# Patient Record
Sex: MALE | Race: WHITE
[De-identification: names, ages, dates, MRNs, and addresses within clinical notes are randomized per-mention and may not be internally consistent; named-entity substitution may affect disease eponyms.]

---

## 2020-02-05 ENCOUNTER — HOSPITAL ENCOUNTER (INPATIENT)
Dept: HOSPITAL 19 - IMCU | Age: 65
LOS: 6 days | Discharge: NURSING, REHAB OR LONG-TERM CARE | DRG: 281 | End: 2020-02-11
Attending: FAMILY MEDICINE | Admitting: FAMILY MEDICINE
Payer: MEDICARE

## 2020-02-05 VITALS — OXYGEN SATURATION: 98 %

## 2020-02-05 VITALS — OXYGEN SATURATION: 99 %

## 2020-02-05 VITALS — OXYGEN SATURATION: 100 %

## 2020-02-05 VITALS — OXYGEN SATURATION: 87 %

## 2020-02-05 VITALS — OXYGEN SATURATION: 97 %

## 2020-02-05 VITALS
HEART RATE: 87 BPM | SYSTOLIC BLOOD PRESSURE: 145 MMHG | TEMPERATURE: 98.6 F | OXYGEN SATURATION: 100 % | DIASTOLIC BLOOD PRESSURE: 83 MMHG

## 2020-02-05 VITALS — TEMPERATURE: 98.5 F | HEART RATE: 92 BPM | SYSTOLIC BLOOD PRESSURE: 143 MMHG | DIASTOLIC BLOOD PRESSURE: 83 MMHG

## 2020-02-05 VITALS — OXYGEN SATURATION: 96 %

## 2020-02-05 VITALS — OXYGEN SATURATION: 92 %

## 2020-02-05 VITALS — OXYGEN SATURATION: 95 %

## 2020-02-05 VITALS — OXYGEN SATURATION: 91 %

## 2020-02-05 VITALS — OXYGEN SATURATION: 93 %

## 2020-02-05 VITALS — BODY MASS INDEX: 28.2 KG/M2 | WEIGHT: 179.68 LBS | HEIGHT: 67.01 IN

## 2020-02-05 DIAGNOSIS — I08.1: ICD-10-CM

## 2020-02-05 DIAGNOSIS — F15.10: ICD-10-CM

## 2020-02-05 DIAGNOSIS — I11.0: ICD-10-CM

## 2020-02-05 DIAGNOSIS — Z87.891: ICD-10-CM

## 2020-02-05 DIAGNOSIS — E11.9: ICD-10-CM

## 2020-02-05 DIAGNOSIS — Z79.84: ICD-10-CM

## 2020-02-05 DIAGNOSIS — F41.9: ICD-10-CM

## 2020-02-05 DIAGNOSIS — M79.7: ICD-10-CM

## 2020-02-05 DIAGNOSIS — I48.91: ICD-10-CM

## 2020-02-05 DIAGNOSIS — E87.2: ICD-10-CM

## 2020-02-05 DIAGNOSIS — F31.9: ICD-10-CM

## 2020-02-05 DIAGNOSIS — G40.909: ICD-10-CM

## 2020-02-05 DIAGNOSIS — S82.62XA: ICD-10-CM

## 2020-02-05 DIAGNOSIS — E87.1: ICD-10-CM

## 2020-02-05 DIAGNOSIS — F11.10: ICD-10-CM

## 2020-02-05 DIAGNOSIS — E78.5: ICD-10-CM

## 2020-02-05 DIAGNOSIS — I21.4: Primary | ICD-10-CM

## 2020-02-05 DIAGNOSIS — I44.7: ICD-10-CM

## 2020-02-05 DIAGNOSIS — Z79.82: ICD-10-CM

## 2020-02-05 DIAGNOSIS — E55.9: ICD-10-CM

## 2020-02-05 DIAGNOSIS — I48.0: ICD-10-CM

## 2020-02-05 DIAGNOSIS — S82.402A: ICD-10-CM

## 2020-02-05 DIAGNOSIS — S22.079A: ICD-10-CM
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
## 2020-02-05 DIAGNOSIS — J45.909: ICD-10-CM

## 2020-02-05 DIAGNOSIS — K59.00: ICD-10-CM

## 2020-02-05 DIAGNOSIS — I50.32: ICD-10-CM

## 2020-02-05 DIAGNOSIS — K21.9: ICD-10-CM

## 2020-02-05 DIAGNOSIS — Z79.02: ICD-10-CM

## 2020-02-05 LAB
ANION GAP SERPL CALC-SCNC: 17 MMOL/L (ref 7–16)
BUN SERPL-MCNC: 18 MG/DL (ref 9–20)
CALCIUM SERPL-MCNC: 9 MG/DL (ref 8.4–10.2)
CHLORIDE SERPL-SCNC: 95 MMOL/L (ref 98–107)
CO2 SERPL-SCNC: 21 MMOL/L (ref 22–30)
CREAT SERPL-SCNC: 0.72 UMOL/L (ref 0.66–1.25)
GLUCOSE SERPL-MCNC: 226 MG/DL (ref 74–106)
PHENYTOIN SERPL-MCNC: 13.2 UG/ML (ref 10–20)
POTASSIUM SERPL-SCNC: 4.3 MMOL/L (ref 3.4–5)
SODIUM SERPL-SCNC: 133 MMOL/L (ref 137–145)
TROPONIN I SERPL-MCNC: 0.06 NG/ML (ref 0–0.04)

## 2020-02-06 VITALS — OXYGEN SATURATION: 100 %

## 2020-02-06 VITALS — OXYGEN SATURATION: 99 %

## 2020-02-06 VITALS — OXYGEN SATURATION: 98 %

## 2020-02-06 VITALS — OXYGEN SATURATION: 84 %

## 2020-02-06 VITALS — OXYGEN SATURATION: 96 %

## 2020-02-06 VITALS — OXYGEN SATURATION: 83 %

## 2020-02-06 VITALS — OXYGEN SATURATION: 92 %

## 2020-02-06 VITALS — OXYGEN SATURATION: 75 %

## 2020-02-06 VITALS — OXYGEN SATURATION: 88 %

## 2020-02-06 VITALS — OXYGEN SATURATION: 80 %

## 2020-02-06 VITALS — OXYGEN SATURATION: 89 %

## 2020-02-06 VITALS — OXYGEN SATURATION: 95 %

## 2020-02-06 VITALS — OXYGEN SATURATION: 91 %

## 2020-02-06 VITALS — OXYGEN SATURATION: 73 %

## 2020-02-06 VITALS
OXYGEN SATURATION: 84 % | TEMPERATURE: 98.8 F | DIASTOLIC BLOOD PRESSURE: 84 MMHG | HEART RATE: 90 BPM | SYSTOLIC BLOOD PRESSURE: 160 MMHG

## 2020-02-06 VITALS — OXYGEN SATURATION: 90 %

## 2020-02-06 VITALS — OXYGEN SATURATION: 94 %

## 2020-02-06 VITALS
HEART RATE: 86 BPM | TEMPERATURE: 97.8 F | OXYGEN SATURATION: 98 % | SYSTOLIC BLOOD PRESSURE: 145 MMHG | DIASTOLIC BLOOD PRESSURE: 80 MMHG

## 2020-02-06 VITALS — OXYGEN SATURATION: 97 %

## 2020-02-06 VITALS — OXYGEN SATURATION: 82 %

## 2020-02-06 VITALS — OXYGEN SATURATION: 87 %

## 2020-02-06 VITALS — SYSTOLIC BLOOD PRESSURE: 152 MMHG | DIASTOLIC BLOOD PRESSURE: 83 MMHG | TEMPERATURE: 98.3 F | HEART RATE: 91 BPM

## 2020-02-06 VITALS — OXYGEN SATURATION: 71 %

## 2020-02-06 VITALS — OXYGEN SATURATION: 85 %

## 2020-02-06 VITALS — OXYGEN SATURATION: 69 %

## 2020-02-06 VITALS — SYSTOLIC BLOOD PRESSURE: 148 MMHG | TEMPERATURE: 97 F | DIASTOLIC BLOOD PRESSURE: 87 MMHG | HEART RATE: 120 BPM

## 2020-02-06 VITALS — OXYGEN SATURATION: 93 %

## 2020-02-06 VITALS — OXYGEN SATURATION: 68 %

## 2020-02-06 VITALS — OXYGEN SATURATION: 59 %

## 2020-02-06 VITALS — OXYGEN SATURATION: 63 %

## 2020-02-06 VITALS — OXYGEN SATURATION: 86 %

## 2020-02-06 VITALS — OXYGEN SATURATION: 76 %

## 2020-02-06 VITALS — OXYGEN SATURATION: 81 %

## 2020-02-06 VITALS — OXYGEN SATURATION: 67 %

## 2020-02-06 VITALS — OXYGEN SATURATION: 72 %

## 2020-02-06 VITALS — OXYGEN SATURATION: 65 %

## 2020-02-06 LAB
ANION GAP SERPL CALC-SCNC: 9 MMOL/L (ref 7–16)
BASOPHILS # BLD: 0.1 10*3/UL (ref 0–0.2)
BASOPHILS NFR BLD AUTO: 0.5 % (ref 0–2)
BUN SERPL-MCNC: 19 MG/DL (ref 9–20)
CALCIUM SERPL-MCNC: 8 MG/DL (ref 8.4–10.2)
CHLORIDE SERPL-SCNC: 99 MMOL/L (ref 98–107)
CHOLEST SPEC-SCNC: 123 MG/DL (ref 120–200)
CHOLEST/HDLC SERPL-SRTO: 2.5
CO2 SERPL-SCNC: 23 MMOL/L (ref 22–30)
CREAT SERPL-SCNC: 0.61 UMOL/L (ref 0.66–1.25)
EOSINOPHIL # BLD: 0.1 10*3/UL (ref 0–0.7)
EOSINOPHIL NFR BLD: 0.7 % (ref 0–4)
ERYTHROCYTE [DISTWIDTH] IN BLOOD BY AUTOMATED COUNT: 12.7 % (ref 11.5–14.5)
GLUCOSE SERPL-MCNC: 201 MG/DL (ref 74–106)
GRANULOCYTES # BLD AUTO: 81.4 % (ref 42.2–75.2)
HCT VFR BLD AUTO: 37 % (ref 42–52)
HDLC SERPL-MCNC: 49 MG/DL
HGB BLD-MCNC: 12.8 G/DL (ref 13.5–18)
LDLC SERPL-MCNC: 44 MG/DL
LYMPHOCYTES # BLD: 1.1 10*3/UL (ref 1.2–3.4)
LYMPHOCYTES NFR BLD: 7.5 % (ref 20–51)
MCH RBC QN AUTO: 30 PG (ref 27–31)
MCHC RBC AUTO-ENTMCNC: 35 G/DL (ref 33–37)
MCV RBC AUTO: 87 FL (ref 80–100)
MONOCYTES # BLD: 1.5 10*3/UL (ref 0.1–0.6)
MONOCYTES NFR BLD AUTO: 9.6 % (ref 1.7–9.3)
NEUTROPHILS # BLD: 12.2 10*3/UL (ref 1.4–6.5)
PLATELET # BLD AUTO: 178 K/MM3 (ref 130–400)
PMV BLD AUTO: 8.8 FL (ref 7.4–10.4)
POTASSIUM SERPL-SCNC: 3.9 MMOL/L (ref 3.4–5)
RBC # BLD AUTO: 4.24 M/MM3 (ref 4.2–5.6)
SODIUM SERPL-SCNC: 131 MMOL/L (ref 137–145)
TRIGL SERPL-MCNC: 151 MG/DL
TROPONIN I SERPL-MCNC: 0.06 NG/ML (ref 0–0.04)

## 2020-02-06 NOTE — NUR
TANIA pompa met with the patient to complete initial assessment.  The patient
lives with his dog in [place]. The patient does not use DME and reports
independence with ADLs. The patient's PCP is Dr. Ferreira and patient receives
medications from [facility]. The patient does not have advanced
directives in the EMR. The patient has two daughters, Lin and Karla. The
patient reports his daughters know what he wants if something happens to him.
According to the patient's EMR has lab results from [facility] on 2/5 show UDS
positive methamphetamine, optiates, amphetamines, benzodiazepine, TCA and
barbituates. TANIA pompa addressed substance usage.  The patient reports that
his son passed away 5 years ago. The patient was not interested in drug rehab
at this time. TANIA student provided patient education on substance usage and
its effects. TANIA pompa also provided the phone number to [employer]. An
echocardiogram was ordered for the patient and the patient may tranfer to
surgical, this day.   will continue to follow to ensure a safe
discharge.

## 2020-02-07 VITALS — OXYGEN SATURATION: 99 %

## 2020-02-07 VITALS — OXYGEN SATURATION: 98 %

## 2020-02-07 VITALS — OXYGEN SATURATION: 92 %

## 2020-02-07 VITALS — OXYGEN SATURATION: 97 %

## 2020-02-07 VITALS
TEMPERATURE: 97.8 F | DIASTOLIC BLOOD PRESSURE: 67 MMHG | HEART RATE: 81 BPM | OXYGEN SATURATION: 100 % | SYSTOLIC BLOOD PRESSURE: 139 MMHG

## 2020-02-07 VITALS — OXYGEN SATURATION: 91 %

## 2020-02-07 VITALS — OXYGEN SATURATION: 100 %

## 2020-02-07 VITALS — OXYGEN SATURATION: 95 %

## 2020-02-07 VITALS — OXYGEN SATURATION: 96 %

## 2020-02-07 VITALS — OXYGEN SATURATION: 94 %

## 2020-02-07 VITALS
HEART RATE: 88 BPM | SYSTOLIC BLOOD PRESSURE: 149 MMHG | OXYGEN SATURATION: 98 % | TEMPERATURE: 98.3 F | DIASTOLIC BLOOD PRESSURE: 93 MMHG

## 2020-02-07 VITALS — OXYGEN SATURATION: 82 %

## 2020-02-07 VITALS — OXYGEN SATURATION: 93 %

## 2020-02-07 VITALS — HEART RATE: 92 BPM | SYSTOLIC BLOOD PRESSURE: 159 MMHG | DIASTOLIC BLOOD PRESSURE: 83 MMHG | TEMPERATURE: 98 F

## 2020-02-07 VITALS
DIASTOLIC BLOOD PRESSURE: 89 MMHG | TEMPERATURE: 98.2 F | HEART RATE: 80 BPM | SYSTOLIC BLOOD PRESSURE: 145 MMHG | OXYGEN SATURATION: 99 %

## 2020-02-07 VITALS — SYSTOLIC BLOOD PRESSURE: 160 MMHG | DIASTOLIC BLOOD PRESSURE: 83 MMHG | HEART RATE: 83 BPM | TEMPERATURE: 97.3 F

## 2020-02-07 VITALS — DIASTOLIC BLOOD PRESSURE: 65 MMHG | HEART RATE: 77 BPM | SYSTOLIC BLOOD PRESSURE: 122 MMHG | OXYGEN SATURATION: 99 %

## 2020-02-07 VITALS — OXYGEN SATURATION: 89 %

## 2020-02-07 VITALS — OXYGEN SATURATION: 90 %

## 2020-02-07 LAB
ALBUMIN SERPL-MCNC: 3.1 GM/DL (ref 3.5–5)
ALP SERPL-CCNC: 132 U/L (ref 50–136)
ALT SERPL-CCNC: 32 U/L (ref 21–72)
ANION GAP SERPL CALC-SCNC: 8 MMOL/L (ref 7–16)
AST SERPL-CCNC: 31 U/L (ref 15–37)
BASOPHILS # BLD: 0.1 10*3/UL (ref 0–0.2)
BASOPHILS NFR BLD AUTO: 0.9 % (ref 0–2)
BILIRUB SERPL-MCNC: 0.4 MG/DL (ref 0–1)
BUN SERPL-MCNC: 10 MG/DL (ref 9–20)
CALCIUM SERPL-MCNC: 7.7 MG/DL (ref 8.4–10.2)
CHLORIDE SERPL-SCNC: 101 MMOL/L (ref 98–107)
CO2 SERPL-SCNC: 24 MMOL/L (ref 22–30)
CREAT SERPL-SCNC: 0.4 UMOL/L (ref 0.66–1.25)
EOSINOPHIL # BLD: 0.3 10*3/UL (ref 0–0.7)
EOSINOPHIL NFR BLD: 3.8 % (ref 0–4)
ERYTHROCYTE [DISTWIDTH] IN BLOOD BY AUTOMATED COUNT: 12.8 % (ref 11.5–14.5)
GLUCOSE SERPL-MCNC: 182 MG/DL (ref 74–106)
GRANULOCYTES # BLD AUTO: 63.3 % (ref 42.2–75.2)
HCT VFR BLD AUTO: 33.2 % (ref 42–52)
HGB BLD-MCNC: 11.6 G/DL (ref 13.5–18)
LYMPHOCYTES # BLD: 1.3 10*3/UL (ref 1.2–3.4)
LYMPHOCYTES NFR BLD: 19.7 % (ref 20–51)
MAGNESIUM SERPL-MCNC: 1.8 MG/DL (ref 1.6–2.3)
MCH RBC QN AUTO: 30 PG (ref 27–31)
MCHC RBC AUTO-ENTMCNC: 35 G/DL (ref 33–37)
MCV RBC AUTO: 87 FL (ref 80–100)
MONOCYTES # BLD: 0.8 10*3/UL (ref 0.1–0.6)
MONOCYTES NFR BLD AUTO: 12.1 % (ref 1.7–9.3)
NEUTROPHILS # BLD: 4.2 10*3/UL (ref 1.4–6.5)
PLATELET # BLD AUTO: 144 K/MM3 (ref 130–400)
PMV BLD AUTO: 8.8 FL (ref 7.4–10.4)
POTASSIUM SERPL-SCNC: 3.6 MMOL/L (ref 3.4–5)
PROT SERPL-MCNC: 5.7 GM/DL (ref 6.4–8.2)
RBC # BLD AUTO: 3.84 M/MM3 (ref 4.2–5.6)
SODIUM SERPL-SCNC: 133 MMOL/L (ref 137–145)
TROPONIN I SERPL-MCNC: 0.03 NG/ML (ref 0–0.04)

## 2020-02-07 NOTE — NUR
Patient assessed at this time. Alert and oriented x 4, and able to make needs
known. Complaining of level 9 pain to back. Given PRN APAP, and called and
requested PRN lidocaine patch from [facility] as requested by patient, as well as
Miralax. Orders received and given to patient. Peripheral IVs to bilateral
forearms. Denies SOB and dyspnea. LS CTA. Respirations even and unlabored.
HRR. Telemetry in place: normal sinus. Capillary refill less than 3 seconds.
Non-tenting skin turgor. BSAx4. Abodmen soft and non-tender. No edema. Cast to
right lower extremity intact. Toe touch only while transferring to bedside
commode. Able to urinate clear yellow urine. Voices no further questions,
needs, or concerns at this time. Resting in bed with call light within reach.

## 2020-02-07 NOTE — NUR
Discussed elevated BP with Dr. Whyte. Orders for pain medication received.
Finishing rounds upstairs and will be down shortly.

## 2020-02-07 NOTE — NUR
PT/OT are recommending post acute rehab. MSW student provided Medicare.gov's
list of post acute rehab facilities in the patient's geographical location.
The patient did not make a choice at this time, will think on it. Social
services will continue to follow.

## 2020-02-07 NOTE — NUR
Patient arrived to room 318 at this time from the ICU, he is alert/oriented,
vital signs stable, denies needs at this time

## 2020-02-07 NOTE — NUR
MSW student met with the patient to discuss a skilled nursing placement.  The
patient's first choice is [facility] and second choice is
[facility] in [place]. The patient choice form was signed and placed in
the chart.  MSW student faxed referrals. [facility] reports they will give
reponse on Monday.  will continue to follow.

## 2020-02-08 VITALS — HEART RATE: 83 BPM | DIASTOLIC BLOOD PRESSURE: 61 MMHG | SYSTOLIC BLOOD PRESSURE: 130 MMHG | TEMPERATURE: 97.9 F

## 2020-02-08 VITALS — TEMPERATURE: 98.6 F | DIASTOLIC BLOOD PRESSURE: 89 MMHG | SYSTOLIC BLOOD PRESSURE: 151 MMHG | HEART RATE: 83 BPM

## 2020-02-08 VITALS — HEART RATE: 84 BPM | SYSTOLIC BLOOD PRESSURE: 162 MMHG | DIASTOLIC BLOOD PRESSURE: 80 MMHG | TEMPERATURE: 97.7 F

## 2020-02-08 VITALS — SYSTOLIC BLOOD PRESSURE: 163 MMHG | TEMPERATURE: 98.5 F | DIASTOLIC BLOOD PRESSURE: 92 MMHG | HEART RATE: 92 BPM

## 2020-02-08 VITALS — TEMPERATURE: 98.3 F | SYSTOLIC BLOOD PRESSURE: 146 MMHG | DIASTOLIC BLOOD PRESSURE: 76 MMHG | HEART RATE: 85 BPM

## 2020-02-08 VITALS — TEMPERATURE: 97.7 F | HEART RATE: 87 BPM | DIASTOLIC BLOOD PRESSURE: 99 MMHG | SYSTOLIC BLOOD PRESSURE: 169 MMHG

## 2020-02-08 LAB
ANION GAP SERPL CALC-SCNC: 7 MMOL/L (ref 7–16)
BASOPHILS # BLD: 0.1 10*3/UL (ref 0–0.2)
BASOPHILS NFR BLD AUTO: 0.9 % (ref 0–2)
BUN SERPL-MCNC: 10 MG/DL (ref 9–20)
CALCIUM SERPL-MCNC: 8.1 MG/DL (ref 8.4–10.2)
CHLORIDE SERPL-SCNC: 92 MMOL/L (ref 98–107)
CO2 SERPL-SCNC: 26 MMOL/L (ref 22–30)
CREAT SERPL-SCNC: 0.39 UMOL/L (ref 0.66–1.25)
EOSINOPHIL # BLD: 0.2 10*3/UL (ref 0–0.7)
EOSINOPHIL NFR BLD: 2.7 % (ref 0–4)
ERYTHROCYTE [DISTWIDTH] IN BLOOD BY AUTOMATED COUNT: 12.6 % (ref 11.5–14.5)
GLUCOSE SERPL-MCNC: 319 MG/DL (ref 74–106)
GRANULOCYTES # BLD AUTO: 69 % (ref 42.2–75.2)
HCT VFR BLD AUTO: 34.7 % (ref 42–52)
HGB BLD-MCNC: 12 G/DL (ref 13.5–18)
LYMPHOCYTES # BLD: 1 10*3/UL (ref 1.2–3.4)
LYMPHOCYTES NFR BLD: 16.8 % (ref 20–51)
MCH RBC QN AUTO: 30 PG (ref 27–31)
MCHC RBC AUTO-ENTMCNC: 35 G/DL (ref 33–37)
MCV RBC AUTO: 87 FL (ref 80–100)
MONOCYTES # BLD: 0.6 10*3/UL (ref 0.1–0.6)
MONOCYTES NFR BLD AUTO: 10.3 % (ref 1.7–9.3)
NEUTROPHILS # BLD: 4 10*3/UL (ref 1.4–6.5)
PLATELET # BLD AUTO: 173 K/MM3 (ref 130–400)
PMV BLD AUTO: 9.1 FL (ref 7.4–10.4)
POTASSIUM SERPL-SCNC: 4.4 MMOL/L (ref 3.4–5)
RBC # BLD AUTO: 4.01 M/MM3 (ref 4.2–5.6)
SODIUM SERPL-SCNC: 126 MMOL/L (ref 137–145)

## 2020-02-08 NOTE — NUR
Patient assessed at this time. Alert and oriented x 4, and able to make needs
known. Reports level 8 pain to left leg, as well as anxiety. Given PRN Norco
and Valium as requested per orders. NS running at 75 ml/hr to peripheral IV to
left forearm. Site is without redness, warmth, swelling, and pain. Denies
having SOB and dyspnea. LS CTA. Respirations even and unlabored. HRR.
Telemetry: sinus. Capillary refill less than 3 seconds. Non-tenting skin
turgor. BSAx4. Abdomen soft and non-tender. No edema. Splint/ace wrap to LLE
CDI. Voices no questions, needs, or concerns at this time. Resting in bed with
call light within reach.

## 2020-02-08 NOTE — NUR
NELI received a call from Patients DR about patients discharge plan. REview of
notes showed proveious NELI faxed over referrals [facility] and [facility] in [place]. [facility] would provide response on
Monday. [facility]I contacted [facility] and spoke to Mena who
informed NELI that we would have to contact the facility on Monday as the
director Natalya Lerner was not here on the weekends and she would be the point
of contact.  Contact phone number is [phone]. Neli contacted patients nurse
and provided update.

## 2020-02-08 NOTE — NUR
Patient requested PRN Ativan for anxiety. Given at this time per orders as
requested. Voices no furhter questions, needs, or concerns at this time.
Reports lidocaine patch is helping with back pain.

## 2020-02-08 NOTE — NUR
Patient given PRN Ativan as requested for anxiety. Voices no further
questions, needs, or concerns at this time. Resting in bed with call light
within reach.

## 2020-02-08 NOTE — NUR
Patient complaining of level 7 pain to back/left leg. Given 2nd dose of Norco
for pain at this time.

## 2020-02-09 VITALS — TEMPERATURE: 98.2 F | SYSTOLIC BLOOD PRESSURE: 158 MMHG | DIASTOLIC BLOOD PRESSURE: 75 MMHG | HEART RATE: 76 BPM

## 2020-02-09 VITALS — TEMPERATURE: 98.3 F | HEART RATE: 79 BPM | DIASTOLIC BLOOD PRESSURE: 83 MMHG | SYSTOLIC BLOOD PRESSURE: 162 MMHG

## 2020-02-09 VITALS — HEART RATE: 78 BPM | TEMPERATURE: 98 F | SYSTOLIC BLOOD PRESSURE: 146 MMHG | DIASTOLIC BLOOD PRESSURE: 65 MMHG

## 2020-02-09 VITALS — SYSTOLIC BLOOD PRESSURE: 175 MMHG | TEMPERATURE: 98.1 F | DIASTOLIC BLOOD PRESSURE: 87 MMHG | HEART RATE: 81 BPM

## 2020-02-09 VITALS — TEMPERATURE: 97.9 F | SYSTOLIC BLOOD PRESSURE: 165 MMHG | HEART RATE: 81 BPM | DIASTOLIC BLOOD PRESSURE: 82 MMHG

## 2020-02-09 VITALS — TEMPERATURE: 98 F | SYSTOLIC BLOOD PRESSURE: 165 MMHG | DIASTOLIC BLOOD PRESSURE: 82 MMHG | HEART RATE: 79 BPM

## 2020-02-09 LAB
ANION GAP SERPL CALC-SCNC: 8 MMOL/L (ref 7–16)
BASOPHILS # BLD: 0.1 10*3/UL (ref 0–0.2)
BASOPHILS NFR BLD AUTO: 0.9 % (ref 0–2)
BUN SERPL-MCNC: 9 MG/DL (ref 9–20)
CALCIUM SERPL-MCNC: 8.1 MG/DL (ref 8.4–10.2)
CHLORIDE SERPL-SCNC: 95 MMOL/L (ref 98–107)
CO2 SERPL-SCNC: 27 MMOL/L (ref 22–30)
CREAT SERPL-SCNC: 0.42 UMOL/L (ref 0.66–1.25)
EOSINOPHIL # BLD: 0.3 10*3/UL (ref 0–0.7)
EOSINOPHIL NFR BLD: 5 % (ref 0–4)
ERYTHROCYTE [DISTWIDTH] IN BLOOD BY AUTOMATED COUNT: 12.4 % (ref 11.5–14.5)
GLUCOSE SERPL-MCNC: 226 MG/DL (ref 74–106)
GRANULOCYTES # BLD AUTO: 56 % (ref 42.2–75.2)
HCT VFR BLD AUTO: 35.5 % (ref 42–52)
HGB BLD-MCNC: 12.4 G/DL (ref 13.5–18)
LYMPHOCYTES # BLD: 1.6 10*3/UL (ref 1.2–3.4)
LYMPHOCYTES NFR BLD: 27.6 % (ref 20–51)
MCH RBC QN AUTO: 30 PG (ref 27–31)
MCHC RBC AUTO-ENTMCNC: 35 G/DL (ref 33–37)
MCV RBC AUTO: 87 FL (ref 80–100)
MONOCYTES # BLD: 0.6 10*3/UL (ref 0.1–0.6)
MONOCYTES NFR BLD AUTO: 10.3 % (ref 1.7–9.3)
NEUTROPHILS # BLD: 3.3 10*3/UL (ref 1.4–6.5)
PLATELET # BLD AUTO: 184 K/MM3 (ref 130–400)
PMV BLD AUTO: 9.2 FL (ref 7.4–10.4)
POTASSIUM SERPL-SCNC: 3.9 MMOL/L (ref 3.4–5)
RBC # BLD AUTO: 4.09 M/MM3 (ref 4.2–5.6)
SODIUM SERPL-SCNC: 130 MMOL/L (ref 137–145)

## 2020-02-09 NOTE — NUR
Patient complaining of pain around 0320, and was given PRN Norco. Continued to
have level 10 pain to back around 0500. Given 2nd dose of Norco as requested.
Voices no further questions, needs, or concerns at this time. Resting in bed
with call light within reach. NS continues to run at 75 ml/hr per orders.

## 2020-02-09 NOTE — NUR
Patient sitting in bed, starting to eat breakfast upon arrival in the room and
shift assessment. Patient complains of pain in his middle right back at 8/10.
Splint with ace wrap in tact to left ankle. Denies pain to ankle, toe touch
weight bearing. States he does have some pain with the toe touch weight
bearing and has not been putting hardly any weight on foot due to this.
Patient up to use the bathroom with a walker and did so without difficulty.
Call light in reach. Bed alarm on.

## 2020-02-10 VITALS — HEART RATE: 76 BPM | SYSTOLIC BLOOD PRESSURE: 157 MMHG | DIASTOLIC BLOOD PRESSURE: 76 MMHG | TEMPERATURE: 97.6 F

## 2020-02-10 VITALS — DIASTOLIC BLOOD PRESSURE: 79 MMHG | TEMPERATURE: 97.6 F | HEART RATE: 78 BPM | SYSTOLIC BLOOD PRESSURE: 176 MMHG

## 2020-02-10 VITALS — TEMPERATURE: 98.1 F | SYSTOLIC BLOOD PRESSURE: 176 MMHG | DIASTOLIC BLOOD PRESSURE: 89 MMHG | HEART RATE: 82 BPM

## 2020-02-10 VITALS — TEMPERATURE: 97.7 F | HEART RATE: 83 BPM | DIASTOLIC BLOOD PRESSURE: 84 MMHG | SYSTOLIC BLOOD PRESSURE: 170 MMHG

## 2020-02-10 VITALS — TEMPERATURE: 98.4 F | SYSTOLIC BLOOD PRESSURE: 160 MMHG | HEART RATE: 88 BPM | DIASTOLIC BLOOD PRESSURE: 76 MMHG

## 2020-02-10 VITALS — HEART RATE: 93 BPM | TEMPERATURE: 98.3 F | SYSTOLIC BLOOD PRESSURE: 162 MMHG | DIASTOLIC BLOOD PRESSURE: 83 MMHG

## 2020-02-10 LAB
ANION GAP SERPL CALC-SCNC: 7 MMOL/L (ref 7–16)
BUN SERPL-MCNC: 13 MG/DL (ref 9–20)
CALCIUM SERPL-MCNC: 8.3 MG/DL (ref 8.4–10.2)
CHLORIDE SERPL-SCNC: 95 MMOL/L (ref 98–107)
CO2 SERPL-SCNC: 30 MMOL/L (ref 22–30)
CREAT SERPL-SCNC: 0.58 UMOL/L (ref 0.66–1.25)
GLUCOSE SERPL-MCNC: 217 MG/DL (ref 74–106)
POTASSIUM SERPL-SCNC: 4.1 MMOL/L (ref 3.4–5)
SODIUM SERPL-SCNC: 132 MMOL/L (ref 137–145)

## 2020-02-10 NOTE — NUR
spoke with Damari,  from [facility] after she
assessed patient. Damari advised she would need final word from her
 before accepting referral but they may be able to accept
tomorrow. Damari expressed concern about patient not qualifying for Medicaid.
SW provided Damari a copy of confirmation that Medicaid has been applied for.
Damari also inquired about DPOA-HC, which patient does not have established.
SW met with patient to provide update. SW discussed DPOA-HC paperwork and
other placement options if [facility] is not able to accept. Patient reports
he does not want to go to Inpatient Rehab at this time has he feels he can not
tolerate three hours of therapy a day. Patient not feeling well and not up to
completing DPOA-HC form. SW to continue to follow.

## 2020-02-10 NOTE — NUR
followed up with Deanne at [facility] and was
advised they could not accept referral. ROSA spoke with Damari at [facility] who advised she and the facility's DON will be at the hospital at 1300 to
complete assessment of patient to determine if they can accept. SW provided
this update to patient and Kristine, Nurse Practitioner. SW to continue to
follow.

## 2020-02-10 NOTE — NUR
Pt assessment completed and charted. Morning medications administered per
MAR. Pt laying in bed, A&O. Pt c/o "back spasm" in the mid back, offered
flexiril, stated he didn't "think it really worked". At rounds w/ hospitalist,
pt stated he hadn't received it and wasn't opposed to taking it. Will
administered PRN. Pt rating pain 9/10. Requesting pain medication, received
Norco 1 tab per nght shift at 6am. Will administer PRN Norco per MAR. denies
chest pain, dizziness, SOB, N/V/D. Meds changed during rounds. RFA IV catheter
was coming out, removed, no complications. LFA INT IV still in place, flushes
w/o complications. Pt has left leg w/ splint and ace bandage wrapped, pulses
strong bilaterally. No other concerns noted at this time.

## 2020-02-10 NOTE — NUR
Pt stated he felt like his BS was low. Checked, result 62, given OJ, PB, and
crackers. Novolog for evening not administered. No other concerns noted.

## 2020-02-10 NOTE — NUR
ASSESSMENT COMPLETE. RESTING IN BED, WATCHING TV. LLE ACE AND SPLINT TO CALF
CDI. LEFT FOOT WARM AND PINK. C/O OF BACK PAIN. DENIES NEEDS AT THIS TIME.

## 2020-02-11 VITALS — HEART RATE: 84 BPM | DIASTOLIC BLOOD PRESSURE: 77 MMHG | SYSTOLIC BLOOD PRESSURE: 150 MMHG | TEMPERATURE: 97.7 F

## 2020-02-11 VITALS — DIASTOLIC BLOOD PRESSURE: 78 MMHG | HEART RATE: 91 BPM | TEMPERATURE: 97.4 F | SYSTOLIC BLOOD PRESSURE: 158 MMHG

## 2020-02-11 VITALS — TEMPERATURE: 98.2 F | HEART RATE: 82 BPM | DIASTOLIC BLOOD PRESSURE: 89 MMHG | SYSTOLIC BLOOD PRESSURE: 184 MMHG

## 2020-02-11 VITALS — DIASTOLIC BLOOD PRESSURE: 78 MMHG | TEMPERATURE: 97.4 F | SYSTOLIC BLOOD PRESSURE: 158 MMHG | HEART RATE: 91 BPM

## 2020-02-11 NOTE — NUR
Pt assessment completed and charted, morning medications administered per MAR.
Pt refused bathing prior to discharge today. Pt also stated "he doesn't think
he is ready to go" and "not sure he can do all the therapy they want me to
do". "I have constipation, I don't feel like I can do much". Discussed w/
patient about importance of therapy, eating well and management of pain
medications. Pt did receive double dose of Miralax yesterday. Pt states he
hasn't had "a good BM" since before he came. Pt receiving pain meds and
doesn't do much ambulating. Pt rating back "spasms" 8/10. Received PRN
flexiril and norco. Pt also has lidocaine patch on. LFA/LW INT IV flushes w/o
complications. Pt on room air, breathing is even and unlabored. Lt leg splint
in place, CDI, pulses strong bilaterally. No other concerns expressed at this
time.

## 2020-02-11 NOTE — NUR
Pt discharged, transportation picked up pt, escorted out via WC. All questions
answered, report called and given to nurse.

## 2020-02-11 NOTE — NUR
was contacted by Damari  at [facility] who
advised they can accept referral. ROSA collaborated with Damari and RNNatalya
to establish transportation for 1100. SW attended clinical rounds and patient
cleared for discharge today. Patient in agreeance to discharge to [facility].
Hospitalist also discussed drug use with patient. Patient states he is going
to stop using meth as he does not want to be readmitted to the hospital. SW
presented and explained IM form to patient who verbalized understanding and
provided signature. ROSA placed form on patient's chart. SW faxed discharge
orders and prescriptions to [facility]. No additional needs at this time.

## 2020-02-11 NOTE — NUR
Pt rounded on by hospitalist and team, POC discussed w/ pt who verbalized
understanding. Pt assisted in getting ready to leave. LFA INT IV dc'd w.
catheter tip intact and no complications. No other concerns at this time.
Awaiting transportation from [place].
